# Patient Record
Sex: MALE | Race: BLACK OR AFRICAN AMERICAN | NOT HISPANIC OR LATINO | Employment: UNEMPLOYED | URBAN - METROPOLITAN AREA
[De-identification: names, ages, dates, MRNs, and addresses within clinical notes are randomized per-mention and may not be internally consistent; named-entity substitution may affect disease eponyms.]

---
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
## 2024-06-03 ENCOUNTER — OFFICE VISIT (OUTPATIENT)
Dept: FAMILY MEDICINE | Facility: CLINIC | Age: 19
End: 2024-06-03
Payer: COMMERCIAL

## 2024-06-03 VITALS
WEIGHT: 153.7 LBS | HEART RATE: 70 BPM | BODY MASS INDEX: 20.85 KG/M2 | RESPIRATION RATE: 18 BRPM | DIASTOLIC BLOOD PRESSURE: 76 MMHG | TEMPERATURE: 98.6 F | SYSTOLIC BLOOD PRESSURE: 118 MMHG | OXYGEN SATURATION: 100 %

## 2024-06-03 DIAGNOSIS — H61.21 IMPACTED CERUMEN OF RIGHT EAR: ICD-10-CM

## 2024-06-03 DIAGNOSIS — H61.23 BILATERAL IMPACTED CERUMEN: Primary | ICD-10-CM

## 2024-06-03 PROCEDURE — 69209 REMOVE IMPACTED EAR WAX UNI: CPT | Mod: 50 | Performed by: PHYSICIAN ASSISTANT

## 2024-06-03 NOTE — PATIENT INSTRUCTIONS
(H61.23) Bilateral impacted cerumen  (primary encounter diagnosis)  Comment:   Plan: IA REMOVAL IMPACTED CERUMEN IRRIGATION/LVG         UNILAT       (H61.21) Impacted cerumen of right ear  Comment:   Plan: carbamide peroxide (DEBROX) 6.5 % otic solution        Use this for a few days and then return to clinic for another ear lavage, this will soften the wax so that we can get it out more easily.

## 2024-06-03 NOTE — PROGRESS NOTES
Patient presents with:  Ear Problem: Both ears plugged.    (H61.23) Bilateral impacted cerumen  (primary encounter diagnosis)  Comment:   Plan: OR REMOVAL IMPACTED CERUMEN IRRIGATION/LVG         UNILAT       (H61.21) Impacted cerumen of right ear  Comment:   Plan: carbamide peroxide (DEBROX) 6.5 % otic solution        Use this for a few days and then return to clinic for another ear lavage, this will soften the wax so that we can get it out more easily.             At the end of the encounter, I discussed results, diagnosis, medications. Discussed red flags for immediate return to clinic/ER, as well as indications for follow up if no improvement. Patient understood and agreed to plan. Patient was stable for discharge     If not improving or if condition worsens, follow up with your Primary Care Provider      SUBJECTIVE:   Raul Love is a 19 year old male who presents today with both ears feeling plugged for the past few days.  Denies any runny nose or congestion.    Denies any ear pain.      Patient is here with his mother who helps with the HPI.  His brother is also here.    No past medical history on file.      Current Outpatient Medications   Medication Sig Dispense Refill    Multiple Vitamins-Iron (DAILY-CONSTANTINO/IRON/BETA-CAROTENE) TABS TAKE 1 TABLET BY MOUTH DAILY. (Patient not taking: Reported on 10/19/2020) 30 tablet 7     Social History     Tobacco Use    Smoking status: Never Smoker    Smokeless tobacco: Never Used   Substance Use Topics    Alcohol use: Not on file     Family History   Problem Relation Age of Onset    Diabetes Mother     Diabetes Father          ROS:    10 point ROS of systems including Constitutional, Eyes, Respiratory, Cardiovascular, Gastroenterology, Genitourinary, Integumentary, Muscularskeletal, Psychiatric ,neurological were all negative except for pertinent positives noted in my HPI       OBJECTIVE:  /76 (BP Location: Left arm, Patient Position: Sitting, Cuff Size: Adult  Regular)   Pulse 70   Temp 98.6  F (37  C) (Oral)   Resp 18   Wt 69.7 kg (153 lb 11.2 oz)   SpO2 100%   BMI 20.85 kg/m    Physical Exam:  GENERAL APPEARANCE: healthy, alert and no distress  EYES: EOMI,  PERRL, conjunctiva clear  Ears: Initially both ear canals are completely filled with soft brown cerumen.  After lavage by nursing the exam is as follows:  HENT: Left ear canal and TM normal.  Right ear canal persist to be blocked by soft brown cerumen.  Nose and mouth without ulcers, erythema or lesions  NECK: supple, nontender, no lymphadenopathy  RESP: lungs clear to auscultation - no rales, rhonchi or wheezes  CV: regular rates and rhythm, normal S1 S2, no murmur noted  SKIN: no suspicious lesions or rashes

## 2024-06-24 ENCOUNTER — OFFICE VISIT (OUTPATIENT)
Dept: FAMILY MEDICINE | Facility: CLINIC | Age: 19
End: 2024-06-24
Payer: COMMERCIAL

## 2024-06-24 VITALS
SYSTOLIC BLOOD PRESSURE: 136 MMHG | RESPIRATION RATE: 12 BRPM | HEIGHT: 70 IN | DIASTOLIC BLOOD PRESSURE: 75 MMHG | HEART RATE: 50 BPM | WEIGHT: 156.6 LBS | TEMPERATURE: 98.7 F | BODY MASS INDEX: 22.42 KG/M2 | OXYGEN SATURATION: 98 %

## 2024-06-24 DIAGNOSIS — M67.432 GANGLION CYST OF WRIST, LEFT: Primary | ICD-10-CM

## 2024-06-24 DIAGNOSIS — R03.0 ELEVATED BP WITHOUT DIAGNOSIS OF HYPERTENSION: ICD-10-CM

## 2024-06-24 PROCEDURE — 99203 OFFICE O/P NEW LOW 30 MIN: CPT | Performed by: FAMILY MEDICINE

## 2024-06-28 NOTE — PROGRESS NOTES
ASSESSMENT/PLAN:     Patient Instructions   Follow up with Dr. Holland of sports medicine in about 3 weeks.  Can cancel if it goes away.      ICD-10-CM    1. Ganglion cyst of wrist, left  M67.432 [facility] [place] [facility] Referral      2. Elevated BP without diagnosis of hypertension  R03.0                 SUBJECTIVE:   Lydia Lam is a 19 year old male who presents to clinic today for the following health issues:  Wrist lesion--noticed a lesion on his wrist and was concerned about what it could be. Says he has been doing more activity with this recently (I believe weightlifting).  Says this lesion seems irritated at times with some tenderness or aching. Gave a wrist splint.  Urged him to try to let it rest some and see if it will spontaneously improve.  Otherwise to follow up with Sports Med to discuss further and to possibly set up drainage.  Might be worth working on lifting technique to avoid issues in the future as well. Can use ibuprofen or ice if necessary.  Elevated BP--very little care so unclear if this is isolated or persistent.  Urged patient to keep track of this.  Also talked briefly about other preventative care such as vaccines and blood screening tests but was not interested today.      Problem list and histories reviewed & adjusted, as indicated.  Additional history: as documented    There is no problem list on file for this patient.    No past surgical history on file.    Social History     Tobacco Use    Smoking status: Never    Smokeless tobacco: Not on file   Substance Use Topics    Alcohol use: Not on file     No family history on file.      Current Outpatient Medications   Medication Sig Dispense Refill    ibuprofen (ADVIL/MOTRIN) 600 MG tablet Take 1 tablet (600 mg) by mouth every 6 hours as needed for pain (Patient not taking: Reported on 6/24/2024) 60 tablet 0     No Known Allergies    Reviewed and updated as needed this visit by clinical staff   Tobacco   "Allergies  Meds            Reviewed and updated as needed this visit by Provider                   ROS:  Constitutional, HEENT, cardiovascular, pulmonary, gi and gu systems are negative, except as otherwise noted.    OBJECTIVE:     /75 (BP Location: Left arm, Patient Position: Sitting, Cuff Size: Adult Regular)   Pulse 50   Temp 98.7  F (37.1  C) (Oral)   Resp 12   Ht 1.778 m (5' 10\")   Wt 71 kg (156 lb 9.6 oz)   SpO2 98%   BMI 22.47 kg/m    Body mass index is 22.47 kg/m .  GENERAL: alert and no distress  CV: regular rate and rhythm, normal S1 S2, no S3 or S4, no murmur, click or rub, no peripheral edema  MS: no gross musculoskeletal defects noted with exception of a small around 1 cm lesion on the dorsum of the wrist, mobile, smooth and firm but not hard and feeling like it is attached to a tendon, no edema, warmth, or induration, wrist with good rom.    Diagnostic Test Results:  none         Emery Sheffield MD  [facility]   "

## 2024-07-16 ENCOUNTER — OFFICE VISIT (OUTPATIENT)
Dept: FAMILY MEDICINE | Facility: CLINIC | Age: 19
End: 2024-07-16
Payer: COMMERCIAL

## 2024-07-16 VITALS
DIASTOLIC BLOOD PRESSURE: 74 MMHG | WEIGHT: 159.6 LBS | SYSTOLIC BLOOD PRESSURE: 132 MMHG | HEART RATE: 55 BPM | OXYGEN SATURATION: 99 % | HEIGHT: 70 IN | TEMPERATURE: 98 F | RESPIRATION RATE: 15 BRPM | BODY MASS INDEX: 22.85 KG/M2

## 2024-07-16 DIAGNOSIS — M67.432 GANGLION CYST OF WRIST, LEFT: Primary | ICD-10-CM

## 2024-07-16 PROCEDURE — 99213 OFFICE O/P EST LOW 20 MIN: CPT | Performed by: FAMILY MEDICINE

## 2024-07-16 NOTE — PROGRESS NOTES
"  Assessment & Plan     Ganglion cyst of wrist, left  1 cm ganglion cyst present on the dorsum of L hand. Explained to patient the high risk of recurrence with drainage, and given sensitive location of the cyst, this would need to be evaluated by hand surgery if excision is preferred. Patient agreeable to opt out of excision and return precautions reviewed to present back to clinic for evaluation.       Return if symptoms worsen or fail to improve.    Jamee Freeman is a 19 year old, presenting for the following health issues:  Mass (Lump on left hand)        7/16/2024    10:43 AM   Additional Questions   Roomed by Doua   Accompanied by Self         7/16/2024    10:43 AM   Patient Reported Additional Medications   Patient reports taking the following new medications n/a         7/16/2024    Information    services provided? No        Hx of Ganglion Cyst in L dorsum of hand. Seen by Dr. Sheffield who recommded wrist brace and evaluation by sports medicine clinic for possible drainage.Present for couple months, not painful or particularly bothersome to patient. Has not been getting bigger. Notices some weakness in forearm and irritation around that area with weightlifting and tight .        Review of Systems  Constitutional, HEENT, cardiovascular, pulmonary, GI, , musculoskeletal, neuro, skin, endocrine and psych systems are negative, except as otherwise noted.      Objective    /74   Pulse 55   Temp 98  F (36.7  C) (Oral)   Resp 15   Ht 1.778 m (5' 10\")   Wt 72.4 kg (159 lb 9.6 oz)   SpO2 99%   BMI 22.90 kg/m    Body mass index is 22.9 kg/m .  Physical Exam  Vitals reviewed.   Constitutional:       General: He is not in acute distress.     Appearance: Normal appearance. He is not ill-appearing.   HENT:      Head: Normocephalic and atraumatic.   Eyes:      Conjunctiva/sclera: Conjunctivae normal.   Pulmonary:      Effort: Pulmonary effort is normal.   Musculoskeletal:    "   Comments: 1 cm mobile, circumfertial lesion present on the dorsum of L hand proximal to distal carpal row in between the 2nd and 3rd digit tendons. non-tender to touch without associated erythema or induration.    Neurological:      General: No focal deficit present.      Mental Status: He is alert.   Psychiatric:         Mood and Affect: Mood normal.                Mary Melendez DO     Patient seen, evaluated and discussed with the resident. I have verified the content of the note, which accurately reflects my assessment of the patient and the plan of care.  Given options of leaving this alone, aspiration, and surgery consult.  Watchful waiting is the patient's  Choice.   Supervising Physician:  Leelee Holland MD     Signed Electronically by: Leelee Holland MD

## 2024-07-19 NOTE — PATIENT INSTRUCTIONS
Patient Education   Here is the plan from today's visit  1. Ganglion cyst of wrist, left          Please call or return to clinic if your symptoms don't go away.    Follow up plan  No follow-ups on file.    Thank you for coming to [facility] today.  Lab Testing:  **If you had lab testing today and your results are reassuring or normal they will be mailed to you or sent through [facility] within 7 days.   **If the lab tests need quick action we will call you with the results.  **If you are having labs done on a different day, please call [phone] to schedule at [facility] or [phone] for other [facility] Outpatient Lab locations. Labs do not offer walk-in appointments.  The phone number we will call with results is # [phone] (home) . If this is not the best number please call our clinic and change the number.  Medication Refills:  If you need any refills please call your pharmacy and they will contact us.   If you need to  your refill at a new pharmacy, please contact the new pharmacy directly. The new pharmacy will help you get your medications transferred faster.   Scheduling:  If you have any concerns about today's visit or wish to schedule another appointment please call our office during normal business hours [phone] (8-5:00 M-F). If you can no longer make a scheduled visit, please cancel via [facility] or call us to cancel.   If a referral was made to an [facility] specialty provider and you do not get a call from central scheduling, please refer to directions on your visit summary or call our office during normal business hours for assistance.   If a Mammogram was ordered for you at the Breast Center call [phone] to schedule or change your appointment.  If you had an XRay/CT/Ultrasound/MRI ordered the number is [phone] to schedule or change your radiology appointment.   [facility] has limited ultrasound appointments available on Wednesdays, if you would like  your ultrasound at [facility], please call [phone] to schedule.   Medical Concerns:  If you have urgent medical concerns please call [phone] at any time of the day.    Leelee Holland MD

## 2024-09-10 ENCOUNTER — OFFICE VISIT (OUTPATIENT)
Dept: FAMILY MEDICINE | Facility: CLINIC | Age: 19
End: 2024-09-10
Payer: COMMERCIAL

## 2024-09-10 VITALS
SYSTOLIC BLOOD PRESSURE: 125 MMHG | RESPIRATION RATE: 18 BRPM | DIASTOLIC BLOOD PRESSURE: 84 MMHG | HEART RATE: 85 BPM | OXYGEN SATURATION: 99 % | TEMPERATURE: 98.6 F

## 2024-09-10 DIAGNOSIS — L42 PITYRIASIS ROSEA: Primary | ICD-10-CM

## 2024-09-10 PROCEDURE — 99213 OFFICE O/P EST LOW 20 MIN: CPT | Performed by: PHYSICIAN ASSISTANT

## 2024-09-10 NOTE — PATIENT INSTRUCTIONS
Patient was educated on the natural course of rash. It is self-limiting. See your primary care provider if symptoms worsen or do not improve in 7 days. Seek emergency care if you develop severe pain/redness, shortness of breath, or difficulty swallowing.

## 2024-09-10 NOTE — PROGRESS NOTES
URGENT CARE VISIT:    SUBJECTIVE:   HPI:   Raul Love is a 19 year old who presents with rash located over torso and lower portion of face since 1 week(s) ago. Rash is gradual onset and rash seems to be stable. He describes rash as asymptomatic. Patient denies difficulty breathing or throat/tongue swelling. Patient has tried none with no relief of symptoms. Patient has not had contact exposures to new laundry detergents, soaps, lotions, or other potential irritants. Denies new foods or medications.  Patient denies previous history of a similar rash. No one around them has had a similar rash.     PMH: No past medical history on file.  Allergies: Shrimp   Medications:   Current Outpatient Medications   Medication Sig Dispense Refill    carbamide peroxide (DEBROX) 6.5 % otic solution Place 5 drops into the right ear 2 times daily (Patient not taking: Reported on 9/10/2024) 15 mL 0     Social History:   Social History     Socioeconomic History    Marital status: Single     Spouse name: Not on file    Number of children: Not on file    Years of education: Not on file    Highest education level: Not on file   Occupational History    Not on file   Tobacco Use    Smoking status: Never    Smokeless tobacco: Never   Substance and Sexual Activity    Alcohol use: Not on file    Drug use: Not on file    Sexual activity: Not on file   Other Topics Concern    Not on file   Social History Narrative    Not on file     Social Determinants of Health     Financial Resource Strain: Not on file   Food Insecurity: Not on file   Transportation Needs: Not on file   Physical Activity: Not on file   Stress: Not on file   Social Connections: Not on file   Interpersonal Safety: Not on file   Housing Stability: Not on file       ROS: ROS otherwise found to be negative except as noted above.    OBJECTIVE:  /84 (BP Location: Left arm, Patient Position: Sitting, Cuff Size: Adult Regular)   Pulse 85   Temp 98.6  F (37  C) (Oral)   Resp  18   SpO2 99%   General: WDWN in NAD.   Eyes: Non-injected conjunctivas without drainage bilaterally.  Ears: Bilateral TMs are easily visualized without erythema, injection, or effusion. No erythema or edema of external canals.    Oropharynx: No erythema of oropharynx. No edema of tongue.   Cardiac: RRR without murmurs, rubs, or gallops.  Respiratory: LCTAB without adventitious sounds. Non-labored breathing.  Integumentary:   Distribution: generalized  Location: torso, neck, and jaw    Color: hyperpigmented,  Lesion type: macular, round with herald patch on left upper back  Neuro: Alert and oriented.         ASSESSMENT:     ICD-10-CM    1. Pityriasis rosea  L42            PLAN:  Patient Instructions   Patient was educated on the natural course of rash. It is self-limiting. See your primary care provider if symptoms worsen or do not improve in 7 days. Seek emergency care if you develop severe pain/redness, shortness of breath, or difficulty swallowing.    Patient verbalized understanding and is agreeable to plan. The patient was discharged ambulatory and in stable condition.    Brenda Martínez PA-C on 9/10/2024 at 3:54 PM

## 2025-01-10 ENCOUNTER — TELEPHONE (OUTPATIENT)
Dept: FAMILY MEDICINE | Facility: CLINIC | Age: 20
End: 2025-01-10
Payer: COMMERCIAL

## 2025-01-10 NOTE — TELEPHONE ENCOUNTER
No consent to communicate with mother in the chart.    Attempted to call patient directly, unable to leave message.    Sravani Echavarria RN

## 2025-01-10 NOTE — TELEPHONE ENCOUNTER
[facility] phone call message- general phone call:    Reason for call: Patient's mom called in to schedule appt. She states that patient has been complaining of heart pain and sometimes it feels like his heart is stopping. Pt is scheduled for 01/13 with  but thought that a nurse can reach out to the patient incase of red flag.    Return call needed: Yes to pt    OK to leave a message on voice mail? Yes    Primary language: English      needed? No    Call taken on January 10, 2025 at 11:01 AM by Mildred Taylor

## 2025-01-13 ENCOUNTER — OFFICE VISIT (OUTPATIENT)
Dept: FAMILY MEDICINE | Facility: CLINIC | Age: 20
End: 2025-01-13
Payer: COMMERCIAL

## 2025-01-13 VITALS
WEIGHT: 164.6 LBS | RESPIRATION RATE: 15 BRPM | HEART RATE: 45 BPM | SYSTOLIC BLOOD PRESSURE: 135 MMHG | TEMPERATURE: 98.1 F | BODY MASS INDEX: 23.56 KG/M2 | DIASTOLIC BLOOD PRESSURE: 84 MMHG | HEIGHT: 70 IN | OXYGEN SATURATION: 98 %

## 2025-01-13 DIAGNOSIS — R07.9 CHEST PAIN, UNSPECIFIED TYPE: Primary | ICD-10-CM

## 2025-01-13 DIAGNOSIS — Z00.00 HEALTHCARE MAINTENANCE: ICD-10-CM

## 2025-01-13 DIAGNOSIS — Z11.3 ROUTINE SCREENING FOR STI (SEXUALLY TRANSMITTED INFECTION): ICD-10-CM

## 2025-01-13 DIAGNOSIS — R00.1 BRADYCARDIA: ICD-10-CM

## 2025-01-13 DIAGNOSIS — R06.02 SHORTNESS OF BREATH: ICD-10-CM

## 2025-01-13 LAB
ANION GAP SERPL CALCULATED.3IONS-SCNC: 12 MMOL/L (ref 7–15)
BUN SERPL-MCNC: 14.3 MG/DL (ref 6–20)
C TRACH DNA SPEC QL PROBE+SIG AMP: NEGATIVE
CALCIUM SERPL-MCNC: 9.9 MG/DL (ref 8.8–10.4)
CHLORIDE SERPL-SCNC: 101 MMOL/L (ref 98–107)
CREAT SERPL-MCNC: 1 MG/DL (ref 0.67–1.17)
EGFRCR SERPLBLD CKD-EPI 2021: >90 ML/MIN/1.73M2
ERYTHROCYTE [DISTWIDTH] IN BLOOD BY AUTOMATED COUNT: 12.3 % (ref 10–15)
GLUCOSE SERPL-MCNC: 86 MG/DL (ref 70–99)
HBV SURFACE AG SERPL QL IA: NONREACTIVE
HCO3 SERPL-SCNC: 26 MMOL/L (ref 22–29)
HCT VFR BLD AUTO: 49.8 % (ref 40–53)
HCV AB SERPL QL IA: NONREACTIVE
HGB BLD-MCNC: 16.5 G/DL (ref 13.3–17.7)
HIV 1+2 AB+HIV1 P24 AG SERPL QL IA: NONREACTIVE
HOLD SPECIMEN: NORMAL
MCH RBC QN AUTO: 28.7 PG (ref 26.5–33)
MCHC RBC AUTO-ENTMCNC: 33.1 G/DL (ref 31.5–36.5)
MCV RBC AUTO: 87 FL (ref 78–100)
N GONORRHOEA DNA SPEC QL NAA+PROBE: NEGATIVE
PLATELET # BLD AUTO: 231 10E3/UL (ref 150–450)
POTASSIUM SERPL-SCNC: 4.2 MMOL/L (ref 3.4–5.3)
RBC # BLD AUTO: 5.75 10E6/UL (ref 4.4–5.9)
SODIUM SERPL-SCNC: 139 MMOL/L (ref 135–145)
SPECIMEN TYPE: NORMAL
T PALLIDUM AB SER QL: NONREACTIVE
TSH SERPL DL<=0.005 MIU/L-ACNC: 1.06 UIU/ML (ref 0.3–4.2)
WBC # BLD AUTO: 8.4 10E3/UL (ref 4–11)

## 2025-01-13 RX ORDER — IBUPROFEN 600 MG/1
600 TABLET, FILM COATED ORAL EVERY 6 HOURS PRN
COMMUNITY
Start: 2025-01-13

## 2025-01-13 RX ORDER — ASPIRIN 325 MG
TABLET ORAL
COMMUNITY
Start: 2025-01-13

## 2025-01-13 RX ORDER — CHOLECALCIFEROL (VITAMIN D3) 50 MCG
1 TABLET ORAL DAILY
Qty: 90 TABLET | Refills: 3 | Status: SHIPPED | OUTPATIENT
Start: 2025-01-13

## 2025-01-13 NOTE — PROGRESS NOTES
Assessment & Plan     Chest pain, unspecified type  Non-exertional and no associated symptoms, and no signs of ischemia on EKG - making cardiac causes less likely. No family history of early cardiac disease or sudden unexplained death in adolescents/young adults - making HOCM less likely.   Given that symptoms triggered by sitting in the car (increased intraabdominal pressure) and associated with not eating - GERD or other GI etiology more likely. Will do diagnostic trial of omeprazole and advised him to not drive on an empty stomach.   - CBC with Platelets and Reflex to Iron Studies; Future  - EKG 12-lead complete w/read - Clinics   - omeprazole (PRILOSEC) 20 MG DR capsule; Take 1 capsule (20 mg) by mouth daily.  Dispense: 28 capsule; Refill: 0    Sinus Bradycardia  Reassured by no signs of arrythmia on ECG, asymptomatic, and not new (HR in 40s in 2024). Patient has lower BMI and exercises regularly, so this low HR most likely his physiologic baseline.   - EKG 12-lead complete w/read - Clinics  - CBC with Platelets and Reflex to Iron Studies; Future  - Basic metabolic panel; Future  - TSH with free T4 reflex; Future    Shortness of breath with intense exercise, new x 2 months  Shortness of breath with exercise and chest pain never occurring at the same time.   - CBC with Platelets and Reflex to Iron Studies; Future    Routine screening for STI (sexually transmitted infection)  - HIV Antigen Antibody Combo; Future  - Treponema Abs w Reflex to RPR and Titer; Future  - Chlamydia trachomatis/Neisseria gonorrhoeae by PCR - Clinic Collect  - Hepatitis B surface antigen; Future  - Hepatitis C Screen Reflex to HCV RNA Quant and Genotype; Future    Healthcare maintenance   - vitamin D3 (CHOLECALCIFEROL) 50 mcg (2000 units) tablet; Take 1 tablet (50 mcg) by mouth daily.  Dispense: 90 tablet; Refill: 3    Return to clinic in 1 month if chest pain symptoms persist  You will get a phone call if your labs are abnormal  You  "will get a letter if all of your labs are normal    Follow up plan  Return in about 4 weeks (around 2/10/2025) for Follow up.    Return in about 4 weeks (around 2/10/2025) for Follow up.    Jamee Freeman is a 20 year old, presenting for the following health issues:  Other (Pt presents with a cough, heart pain and fevers X2 months )      1/13/2025     9:55 AM   Additional Questions   Roomed by Lyrik   Accompanied by self         1/13/2025    Information    services provided? No     HPI     Chest pain  When driving, will feel his \"heart clutching\" which lasts for 10 seconds, painful.   Occurring once per week. More likely if he hasn't eaten that day.   Started 1-2 months ago.  Nothing tried to alleviate  Denies any dizziness, lightheadedness.   Endorses headache 2 times per week.   Plays soccer 3 times per week, no symptoms during soccer  Last 1-2 months has been getting tired more quickly, feeling out or breath.   Located central and a little to the left.     Wants blood test to check general abner    2 days of URI symptoms  Cough, lost voice, subjective fever        Objective    /84   Pulse (!) 45   Temp 98.1  F (36.7  C) (Oral)   Resp 15   Ht 1.778 m (5' 10\")   Wt 74.7 kg (164 lb 9.6 oz)   SpO2 98%   BMI 23.62 kg/m    Body mass index is 23.62 kg/m .  Physical Exam  Vitals reviewed.   Constitutional:       General: He is not in acute distress.     Appearance: He is well-developed. He is not diaphoretic.   HENT:      Head: Normocephalic and atraumatic.   Eyes:      Conjunctiva/sclera: Conjunctivae normal.   Cardiovascular:      Rate and Rhythm: Regular rhythm. Bradycardia present.      Heart sounds: Normal heart sounds. No murmur heard.  Pulmonary:      Effort: Pulmonary effort is normal. No respiratory distress.      Breath sounds: Normal breath sounds. No wheezing.   Abdominal:      Tenderness: There is no abdominal tenderness.   Skin:     General: Skin is warm and dry. "      Coloration: Skin is not pale.      Findings: No erythema or rash.   Neurological:      Mental Status: He is alert.   Psychiatric:         Behavior: Behavior normal.         Thought Content: Thought content normal.         Judgment: Judgment normal.                I spent a total of 42 minutes on the day of the visit.   Time spent by me today doing chart review, history and exam, documentation and further activities per the note  Signed Electronically by: Gayatri Waddell MD

## 2025-01-13 NOTE — PATIENT INSTRUCTIONS
Patient Education   Here is the plan from today's visit    1. Healthcare maintenance (Primary)  - vitamin D3 (CHOLECALCIFEROL) 50 mcg (2000 units) tablet; Take 1 tablet (50 mcg) by mouth daily.  Dispense: 90 tablet; Refill: 3    2. Routine screening for STI (sexually transmitted infection)  - HIV Antigen Antibody Combo; Future  - Treponema Abs w Reflex to RPR and Titer; Future  - Chlamydia trachomatis/Neisseria gonorrhoeae by PCR - Clinic Collect  - Hepatitis B surface antigen; Future  - Hepatitis C Screen Reflex to HCV RNA Quant and Genotype; Future    3. Shortness of breath  - CBC with Platelets and Reflex to Iron Studies; Future    4. Sinus Bradycardia  - CBC with Platelets and Reflex to Iron Studies; Future  - EKG 12-lead complete w/read - Clinics  - Basic metabolic panel; Future  - TSH with free T4 reflex; Future    5. Chest pain, unspecified type  - CBC with Platelets and Reflex to Iron Studies; Future  - EKG 12-lead complete w/read - Clinics - No signs of arrythmia (abnormal rhythm)  - omeprazole (PRILOSEC) 20 MG DR capsule; Take 1 capsule (20 mg) by mouth daily.  Dispense: 28 capsule; Refill: 0    Return to clinic in 1 month if chest pain symptoms persist  You will get a phone call if your labs are abnormal  You will get a letter if all of your labs are normal    Follow up plan  Return in about 4 weeks (around 2/10/2025) for Follow up.    Thank you for coming to [facility] today.  Lab Testing:  **If you had lab testing today and your results are reassuring or normal they will be mailed to you or sent through [facility] within 7 days.   **If the lab tests need quick action we will call you with the results.  **If you are having labs done on a different day, please call [phone] to schedule at [facility] or [phone] for other [facility] Outpatient Lab locations. Labs do not offer walk-in appointments.  The phone number we will call with results is # [phone] (home) . If this is not the  best number please call our clinic and change the number.  Medication Refills:  If you need any refills please call your pharmacy and they will contact us.   If you need to  your refill at a new pharmacy, please contact the new pharmacy directly. The new pharmacy will help you get your medications transferred faster.   Scheduling:  If you have any concerns about today's visit or wish to schedule another appointment please call our office during normal business hours [phone] (8-5:00 M-F). If you can no longer make a scheduled visit, please cancel via [employer] [employer] or call us to cancel.   If a referral was made to an [facility] specialty provider and you do not get a call from central scheduling, please refer to directions on your visit summary or call our office during normal business hours for assistance.   If a Mammogram was ordered for you at the Breast Center call [phone] to schedule or change your appointment.  If you had an XRay/CT/Ultrasound/MRI ordered the number is [phone] to schedule or change your radiology appointment.   [facility] has limited ultrasound appointments available on Wednesdays, if you would like your ultrasound at [facility], please call [phone] to schedule.   Medical Concerns:  If you have urgent medical concerns please call [phone] at any time of the day.    Gayatri Waddell MD